# Patient Record
(demographics unavailable — no encounter records)

---

## 2025-05-16 NOTE — HEALTH RISK ASSESSMENT
[0] : 2) Feeling down, depressed, or hopeless: Not at all (0) [PHQ-2 Negative - No further assessment needed] : PHQ-2 Negative - No further assessment needed [Former] : Former [15-19] : 15-19 [< 15 Years] : < 15 Years [[id number]] : 0

## 2025-05-16 NOTE — PLAN
[FreeTextEntry1] : Continue all medications as prescribed.   Reviewed age-appropriate preventive screening tests with patient. He declined Tdap and Shingrix vaccines today.  Discussed clean eating (eg Mediterranean style eating plan) and regular exercise/staying as physically active as possible.  Include balance exercises and strength training and core strengthening exercises for bone health and to decrease risk for falls.  Reviewed importance of good self care (e.g. meditation, yoga, adequate rest, regular exercise, magnesium, clean eating, etc.).  Follow up for next physical in one year.

## 2025-05-16 NOTE — HEALTH RISK ASSESSMENT
[0] : 2) Feeling down, depressed, or hopeless: Not at all (0) [PHQ-2 Negative - No further assessment needed] : PHQ-2 Negative - No further assessment needed [Former] : Former [15-19] : 15-19 [< 15 Years] : < 15 Years [[id number]] : 0

## 2025-05-16 NOTE — HISTORY OF PRESENT ILLNESS
[FreeTextEntry1] : RIZWANA GUTIERREZ is a 58 year old male here for a physical exam. [de-identified] : His last physical exam was last year  Vaccines: Tetanus is NOT up to date Shingrix is NOT up to date  His last dentist visit was less than one year ago His last eye doctor appointment was 1-2 years ago His last dermatologist visit was a few years ago  Colon cancer screening is up to date; colonoscopy 12/2024, Dr. Sotelo, repeat 3-5 years  His diet is healthy overall Exercise: weights and cardio

## 2025-05-16 NOTE — HISTORY OF PRESENT ILLNESS
[FreeTextEntry1] : RIZWANA GUTIERREZ is a 58 year old male here for a physical exam. [de-identified] : His last physical exam was last year  Vaccines: Tetanus is NOT up to date Shingrix is NOT up to date  His last dentist visit was less than one year ago His last eye doctor appointment was 1-2 years ago His last dermatologist visit was a few years ago  Colon cancer screening is up to date; colonoscopy 12/2024, Dr. Sotelo, repeat 3-5 years  His diet is healthy overall Exercise: weights and cardio

## 2025-05-16 NOTE — ASSESSMENT
[Vaccines Reviewed] : Immunizations reviewed today. Please see immunization details in the vaccine log within the immunization flowsheet.  [FreeTextEntry1] : RIZWANA GUTIERREZ is a 58 year old male here for a physical exam.  He has a history of coronary artery disease, hypertension, hyperlipidemia, erectile dysfunction, and impaired fasting glucose.  He sees a cardiologist (Dr. Lindsey) regularly and does not need an EKG today.  Labs were done prior. His cholesterol is higher than last year. His total cholesterol naima from 91 to 139. His LDL naima from 41 to 79. His HDL is higher as well, up from 36 to 46. His fasting glucose is normal at 91 and his Hgb1c is down from 5.7 to 5.6. His PSA is stable at 1.73. The remainder of his labs are all normal.